# Patient Record
Sex: FEMALE | Race: WHITE | Employment: UNEMPLOYED | URBAN - METROPOLITAN AREA
[De-identification: names, ages, dates, MRNs, and addresses within clinical notes are randomized per-mention and may not be internally consistent; named-entity substitution may affect disease eponyms.]

---

## 2017-02-28 ENCOUNTER — HOSPITAL ENCOUNTER (OUTPATIENT)
Dept: MAMMOGRAPHY | Age: 52
Discharge: HOME | End: 2017-02-28
Attending: INTERNAL MEDICINE
Payer: COMMERCIAL

## 2017-02-28 DIAGNOSIS — Z12.31 VISIT FOR SCREENING MAMMOGRAM: ICD-10-CM

## 2017-02-28 PROCEDURE — 77067 SCR MAMMO BI INCL CAD: CPT

## 2018-02-13 ENCOUNTER — OFFICE VISIT (OUTPATIENT)
Dept: INTERNAL MEDICINE CLINIC | Age: 53
End: 2018-02-13

## 2018-02-13 VITALS
OXYGEN SATURATION: 99 % | RESPIRATION RATE: 14 BRPM | DIASTOLIC BLOOD PRESSURE: 56 MMHG | HEART RATE: 84 BPM | SYSTOLIC BLOOD PRESSURE: 111 MMHG | HEIGHT: 66 IN | TEMPERATURE: 97.9 F | WEIGHT: 124.8 LBS | BODY MASS INDEX: 20.06 KG/M2

## 2018-02-13 DIAGNOSIS — J06.9 VIRAL UPPER RESPIRATORY TRACT INFECTION: Primary | ICD-10-CM

## 2018-02-13 RX ORDER — MELATONIN
DAILY
COMMUNITY
End: 2020-01-22 | Stop reason: ALTCHOICE

## 2018-02-13 RX ORDER — AZITHROMYCIN 250 MG/1
250 TABLET, FILM COATED ORAL SEE ADMIN INSTRUCTIONS
Qty: 6 TAB | Refills: 0 | Status: SHIPPED | OUTPATIENT
Start: 2018-02-13 | End: 2018-02-18

## 2018-02-13 NOTE — PROGRESS NOTES
HISTORY OF PRESENT ILLNESS  Jenny Elizondo is a 46 y.o. female. HPI   Patient reports scratchy throat and fatigue. She states it feels like a cold that started last week. She denies fever, chills, or chest burning. Patient reports she is flying to [facility] this weekend. Review of Systems   All other systems reviewed and are negative. Physical Exam   Constitutional: She is oriented to person, place, and time. She appears well-developed and well-nourished. HENT:   Head: Normocephalic and atraumatic. Right Ear: External ear normal.   Left Ear: External ear normal.   Nose: Nose normal.   Mouth/Throat: Oropharynx is clear and moist.   Small pocket of fluid in ears, bilaterally. Eyes: Conjunctivae and EOM are normal.   Neck: Normal range of motion. Neck supple. Carotid bruit is not present. No thyroid mass and no thyromegaly present. Cardiovascular: Normal rate, regular rhythm, S1 normal, S2 normal, normal heart sounds and intact distal pulses. Pulmonary/Chest: Effort normal and breath sounds normal.   Abdominal: Soft. Normal appearance and bowel sounds are normal. There is no hepatosplenomegaly. There is no tenderness. Musculoskeletal: Normal range of motion. Neurological: She is alert and oriented to person, place, and time. She has normal strength. No cranial nerve deficit or sensory deficit. Coordination normal.   Skin: Skin is warm, dry and intact. No abrasion and no rash noted. Psychiatric: She has a normal mood and affect. Her behavior is normal. Judgment and thought content normal.   Nursing note and vitals reviewed. ASSESSMENT and PLAN  Diagnoses and all orders for this visit:    1. Viral upper respiratory tract infection  Not suspicious of the flu, patient will continue to watch symptoms. Instructed patient to use Afrin and Flonase. Patient will hold azithromycin if symptoms worsen and she becomes febrile, chilled, sinus pressure.      Other orders  -     azithromycin (ZITHROMAX) 250 mg tablet; Take 1 Tab by mouth See Admin Instructions for 5 days. lab results and schedule of future lab studies reviewed with patient  reviewed diet, exercise and weight control    Written by Nasreen Hagen, as dictated by Raji Arthur MD.     Current diagnosis and concerns discussed with pt at length. Understands risks and benefits or current treatment plan and medications and accepts the treatment and medication with any possible risks. Pt asks appropriate questions which were answered. Pt instructed to call with any concerns or problems.

## 2018-03-15 ENCOUNTER — HOSPITAL ENCOUNTER (OUTPATIENT)
Dept: MAMMOGRAPHY | Age: 53
Discharge: HOME | End: 2018-03-15
Attending: INTERNAL MEDICINE
Payer: COMMERCIAL

## 2018-03-15 DIAGNOSIS — Z12.31 VISIT FOR SCREENING MAMMOGRAM: ICD-10-CM

## 2018-03-15 PROCEDURE — 77067 SCR MAMMO BI INCL CAD: CPT

## 2018-07-24 ENCOUNTER — OFFICE VISIT (OUTPATIENT)
Dept: INTERNAL MEDICINE CLINIC | Age: 53
End: 2018-07-24

## 2018-07-24 VITALS
TEMPERATURE: 98.3 F | WEIGHT: 130.6 LBS | BODY MASS INDEX: 20.99 KG/M2 | RESPIRATION RATE: 14 BRPM | DIASTOLIC BLOOD PRESSURE: 58 MMHG | HEART RATE: 79 BPM | OXYGEN SATURATION: 98 % | SYSTOLIC BLOOD PRESSURE: 104 MMHG | HEIGHT: 66 IN

## 2018-07-24 DIAGNOSIS — H10.32 ACUTE BACTERIAL CONJUNCTIVITIS OF LEFT EYE: Primary | ICD-10-CM

## 2018-07-24 RX ORDER — SULFACETAMIDE SODIUM 100 MG/ML
2 SOLUTION/ DROPS OPHTHALMIC
Qty: 15 ML | Refills: 0 | Status: SHIPPED | OUTPATIENT
Start: 2018-07-24 | End: 2019-02-19 | Stop reason: ALTCHOICE

## 2018-07-24 NOTE — PROGRESS NOTES
HISTORY OF PRESENT ILLNESS  Nicci Fried is a 46 y.o. female. HPI  Bacterial Conjunctivitis: Pt reports pink eye in left eye. She notes irritation, itchiness, and goopiness. Review of Systems   All other systems reviewed and are negative. Physical Exam   Constitutional: She is oriented to person, place, and time. She appears well-developed and well-nourished. HENT:   Head: Normocephalic and atraumatic. Right Ear: External ear normal.   Left Ear: External ear normal.   Nose: Nose normal.   Mouth/Throat: Oropharynx is clear and moist.   Eyes: Conjunctivae and EOM are normal.   Left eye - irritated, erythematous. Neck: Normal range of motion. Neck supple. Carotid bruit is not present. No thyroid mass and no thyromegaly present. Cardiovascular: Normal rate, regular rhythm, S1 normal, S2 normal, normal heart sounds and intact distal pulses. Pulmonary/Chest: Effort normal and breath sounds normal.   Abdominal: Soft. Normal appearance and bowel sounds are normal. There is no hepatosplenomegaly. There is no tenderness. Musculoskeletal: Normal range of motion. Neurological: She is alert and oriented to person, place, and time. She has normal strength. No cranial nerve deficit or sensory deficit. Coordination normal.   Skin: Skin is warm, dry and intact. No abrasion and no rash noted. Psychiatric: She has a normal mood and affect. Her behavior is normal. Judgment and thought content normal.   Nursing note and vitals reviewed. ASSESSMENT and PLAN  Diagnoses and all orders for this visit:    1. Acute bacterial conjunctivitis of left eye  Stable, mild condition. Prescribed Sulfacetamide. Will monitor for any changes or improvements. Other orders  -     sulfacetamide (BLEPH-10) 10 % ophthalmic solution; Administer 2 Drops to left eye every three (3) hours. Lab results and schedule of future lab studies reviewed with patient. Reviewed diet, exercise and weight control.     Written by Katy Everett, as dictated by Faustina Sanchez MD.     Current diagnosis and concerns discussed with pt at length. Understands risks and benefits or current treatment plan and medications and accepts the treatment and medication with any possible risks. Pt asks appropriate questions which were answered. Pt instructed to call with any concerns or problems.

## 2018-07-26 ENCOUNTER — OFFICE VISIT (OUTPATIENT)
Dept: INTERNAL MEDICINE CLINIC | Age: 53
End: 2018-07-26

## 2018-07-26 VITALS
BODY MASS INDEX: 20.7 KG/M2 | HEIGHT: 66 IN | SYSTOLIC BLOOD PRESSURE: 108 MMHG | TEMPERATURE: 98 F | HEART RATE: 82 BPM | WEIGHT: 128.8 LBS | RESPIRATION RATE: 12 BRPM | OXYGEN SATURATION: 98 % | DIASTOLIC BLOOD PRESSURE: 71 MMHG

## 2018-07-26 DIAGNOSIS — H10.32 ACUTE BACTERIAL CONJUNCTIVITIS OF LEFT EYE: ICD-10-CM

## 2018-07-26 DIAGNOSIS — H65.02 ACUTE SEROUS OTITIS MEDIA OF LEFT EAR, RECURRENCE NOT SPECIFIED: ICD-10-CM

## 2018-07-26 DIAGNOSIS — H60.332 ACUTE SWIMMER'S EAR OF LEFT SIDE: Primary | ICD-10-CM

## 2018-07-26 RX ORDER — AZITHROMYCIN 250 MG/1
250 TABLET, FILM COATED ORAL SEE ADMIN INSTRUCTIONS
Qty: 6 TAB | Refills: 0 | Status: SHIPPED | OUTPATIENT
Start: 2018-07-26 | End: 2018-07-31

## 2018-07-26 RX ORDER — NEOMYCIN SULFATE, POLYMYXIN B SULFATE AND HYDROCORTISONE 10; 3.5; 1 MG/ML; MG/ML; [USP'U]/ML
3 SUSPENSION/ DROPS AURICULAR (OTIC) 3 TIMES DAILY
Qty: 10 ML | Refills: 1 | Status: SHIPPED | OUTPATIENT
Start: 2018-07-26 | End: 2020-01-22 | Stop reason: ALTCHOICE

## 2018-07-26 NOTE — PROGRESS NOTES
HISTORY OF PRESENT ILLNESS  Kaylee Weber is a 46 y.o. female. HPI  Presents with complaints of left ear pain for the past 2 days that worsened last night and has been radiating into left jawline and into left side of neck. Was seen in office on 7/24 for left bacterial conjunctivitis and has been using Bleph-10 eye drops; these symptoms have improved significantly but now having ear pain and muffling of hearing. Has had some nasal congestion but has allergies. Denies fever, chills, sore throat, cough. Has been taking Allegra, Sudafed and using Flonase nasal spray. Review of Systems   Constitutional: Positive for malaise/fatigue. Negative for chills and fever. HENT: Positive for congestion, ear pain and hearing loss. Negative for ear discharge, sinus pain and sore throat. Respiratory: Negative for cough. Cardiovascular: Negative for chest pain and palpitations. Gastrointestinal: Negative for abdominal pain, nausea and vomiting. Genitourinary: Negative for dysuria. Musculoskeletal: Negative for myalgias. Skin: Negative for rash. Neurological: Negative for dizziness and headaches. /71 (BP 1 Location: Left arm, BP Patient Position: Sitting)  Pulse 82  Temp 98 °F (36.7 °C) (Oral)   Resp 12  Ht 5' 6\" (1.676 m)  Wt 128 lb 12.8 oz (58.4 kg)  SpO2 98%  BMI 20.79 kg/m2  Physical Exam   Constitutional: She is oriented to person, place, and time. She appears well-developed and well-nourished. HENT:   Head: Normocephalic and atraumatic. Right Ear: Tympanic membrane, external ear and ear canal normal.   Left Ear: There is drainage and swelling. Tympanic membrane is injected. Nose: Nose normal.   Mouth/Throat: Oropharynx is clear and moist.   Tenderness with manipulation of left tragus; EAC appears erythematous with yellow drainage; visible portion of left TM injected. Eyes: Conjunctivae and EOM are normal. Pupils are equal, round, and reactive to light.    Neck: Normal range of motion. Neck supple. No thyromegaly present. Cardiovascular: Normal rate and regular rhythm. Pulmonary/Chest: Effort normal and breath sounds normal.   Lymphadenopathy:     She has no cervical adenopathy. Neurological: She is alert and oriented to person, place, and time. Psychiatric: She has a normal mood and affect. Her behavior is normal.   Nursing note and vitals reviewed. ASSESSMENT and PLAN  Diagnoses and all orders for this visit:    1. Acute swimmer's ear of left side  -     neomycin-polymyxin-hydrocortisone, buffered, (PEDIOTIC) 3.5-10,000-1 mg/mL-unit/mL-% otic suspension; Administer 3 Drops in left ear three (3) times daily. X 4 days    2. Acute serous otitis media of left ear, recurrence not specified  -     azithromycin (ZITHROMAX) 250 mg tablet; Take 1 Tab by mouth See Admin Instructions for 5 days. 3. Conjunctivitis left eye -- exam normal at this time. Follow up if signs and symptoms worsen or change. After hours number given.    reviewed diet, exercise and weight control  reviewed medications and side effects in detail

## 2018-07-26 NOTE — MR AVS SNAPSHOT
[facility] 
 
 
 [address] [place] [address] 
[phone] Patient: Rich Singh MRN:  [id number] Visit Information Date & Time Provider Department Dept. Phone Encounter #  
 7/26/2018  1:00 PM Carla Gibbons NP [facility] [phone] [id number] Your Appointments 8/27/2018  1:30 PM  
Complete Physical with Rayray Ness MD  
[facility] [address]) Appt Note: cpe, garry 1.16.18; CPE, fl 03/29/18 [address] [place] [phone]  
  
   
 [address] [place] [id number] Upcoming Health Maintenance Date Due COLONOSCOPY 9/3/1983 DTaP/Tdap/Td series (1 - Tdap) 9/3/1986 PAP AKA CERVICAL CYTOLOGY 2/19/2013 Influenza Age 5 to Adult 8/1/2018 BREAST CANCER SCRN MAMMOGRAM 3/15/2020 Allergies as of 7/26/2018  Review Complete On: 7/26/2018 By: Carla Gibbons NP Severity Noted Reaction Type Reactions Augmentin [Amoxicillin-pot Clavulanate]  12/23/2011    Other (comments) GI upset Current Immunizations  Never Reviewed Name Date H1N1 FLU VACCINE 2/4/2010 Influenza Vaccine 11/17/2014 Influenza Vaccine Split 10/10/2012, 2/4/2010 Not reviewed this visit You Were Diagnosed With   
  
 Codes Comments Acute swimmer's ear of left side    -  Primary ICD-10-CM: X79.853 ICD-9-CM: 380.12 Acute serous otitis media of left ear, recurrence not specified     ICD-10-CM: H65.02 
ICD-9-CM: 381.01 Vitals BP Pulse Temp Resp Height(growth percentile) Weight(growth percentile) 108/71 (BP 1 Location: Left arm, BP Patient Position: Sitting) 82 98 °F (36.7 °C) (Oral) 12 5' 6\" (1.676 m) 128 lb 12.8 oz (58.4 kg) SpO2 BMI OB Status Smoking Status 98% 20.79 kg/m2 Ablation Never Smoker BMI and BSA Data Body Mass Index Body Surface Area 20.79 kg/m 2 1.65 m 2 Preferred Pharmacy Pharmacy Name Phone [facility] [address] [phone] Your Updated Medication List  
  
   
This list is accurate as of 7/26/18  1:27 PM.  Always use your most recent med list. ALLEGRA PO Take  by mouth. azithromycin 250 mg tablet Commonly known as:  Denise Mika Take 1 Tab by mouth See Admin Instructions for 5 days. fluticasone 50 mcg/actuation nasal spray Commonly known as:  FLONASE  
2 sprays by Both Nostrils route daily for 30 days. MULTI-VITAMIN PO Take  by mouth. neomycin-polymyxin-hydrocortisone (buffered) 3.5-10,000-1 mg/mL-unit/mL-% otic suspension Commonly known as:  [place] Low Administer 3 Drops in left ear three (3) times daily. X 4 days SUDAFED PO Take  by mouth.  
  
 sulfacetamide 10 % ophthalmic solution Commonly known as:  BLEPH-10 Administer 2 Drops to left eye every three (3) hours. VITAMIN D3 1,000 unit tablet Generic drug:  cholecalciferol Take  by mouth daily. Prescriptions Sent to Pharmacy Refills  
 azithromycin (ZITHROMAX) 250 mg tablet 0 Sig: Take 1 Tab by mouth See Admin Instructions for 5 days. Class: Normal  
 Pharmacy: [facility] [place] [address] Ph #: [phone] Route: Oral  
 neomycin-polymyxin-hydrocortisone, buffered, (PEDIOTIC) 3.5-10,000-1 mg/mL-unit/mL-% otic suspension 1 Sig: Administer 3 Drops in left ear three (3) times daily. X 4 days Class: Normal  
 Pharmacy: [facility] [place] [address] Ph #: [phone] Route: Left Ear Patient Instructions Swimmer's Ear: Care Instructions Your Care Instructions Swimmer's ear (otitis externa) is inflammation or infection of the ear canal. This is the passage that leads from the outer ear to the eardrum. Any water, sand, or other debris that gets into the ear canal and stays there can cause swimmer's ear. Putting cotton swabs or other items in the ear to clean it can also cause this problem. Swimmer's ear can be very painful. But you can treat the pain and infection with medicines. You should feel better in a few days. Follow-up care is a key part of your treatment and safety. Be sure to make and go to all appointments, and call your doctor if you are having problems. It's also a good idea to know your test results and keep a list of the medicines you take. How can you care for yourself at home? Cleaning and care · Use antibiotic drops as your doctor directs. · Do not insert ear drops (other than the antibiotic ear drops) or anything else into the ear unless your doctor has told you to. · Avoid getting water in the ear until the problem clears up. Use cotton lightly coated with petroleum jelly as an earplug. Do not use plastic earplugs. · Use a hair dryer set on low to carefully dry the ear after you shower. · To ease ear pain, hold a warm washcloth against your ear. · Take pain medicines exactly as directed. ¨ If the doctor gave you a prescription medicine for pain, take it as prescribed. ¨ If you are not taking a prescription pain medicine, ask your doctor if you can take an over-the-counter medicine. Inserting ear drops · Warm the drops to body temperature by rolling the container in your hands. Or you can place it in a cup of warm water for a few minutes. · Lie down, with your ear facing up. · Place drops inside the ear. Follow your doctor's instructions (or the directions on the label) for how many drops to use. Gently wiggle the outer ear or pull the ear up and back to help the drops get into the ear. · It's important to keep the liquid in the ear canal for 3 to 5 minutes. When should you call for help? Call your doctor now or seek immediate medical care if: 
  · You have a new or higher fever.  
  · You have new or worse pain, swelling, warmth, or redness around or behind your ear.  
  · You have new or increasing pus or blood draining from your ear.  
 Watch closely for changes in your health, and be sure to contact your doctor if: 
  · You are not getting better after 2 days (48 hours). Where can you learn more? Go to http://mallory-chinmay.info/. Enter C706 in the search box to learn more about \"Swimmer's Ear: Care Instructions. \" Current as of: May 12, 2017 Content Version: 11.7 © 5177-2517 [employer]. Care instructions adapted under license by Public[employer]uff (which disclaims liability or warranty for this information). If you have questions about a medical condition or this instruction, always ask your healthcare professional. [place] [address] any warranty or liability for your use of this information. Middle Ear Fluid: Care Instructions Your Care Instructions Fluid often builds up inside the ear during a cold or allergies. Usually the fluid drains away, but sometimes a small tube in the ear, called the eustachian tube, stays blocked for months. Symptoms of fluid buildup may include: · Popping, ringing, or a feeling of fullness or pressure in the ear. · Trouble hearing. · Balance problems and dizziness. In most cases, you can treat yourself at home. Follow-up care is a key part of your treatment and safety. Be sure to make and go to all appointments, and call your doctor if you are having problems. It's also a good idea to know your test results and keep a list of the medicines you take. How can you care for yourself at home? · In most cases, the fluid clears up within a few months without treatment. You may need more tests if the fluid does not clear up after 3 months. · If your doctor prescribed antibiotics, take them as directed. Do not stop taking them just because you feel better. You need to take the full course of antibiotics. When should you call for help? Call your doctor now or seek immediate medical care if: 
  · You have symptoms of infection, such as: 
¨ Increased pain, swelling, warmth, or redness. ¨ Pus draining from the area. ¨ A fever.  
 Watch closely for changes in your health, and be sure to contact your doctor if: 
  · You notice changes in hearing.  
  · You do not get better as expected. Where can you learn more? Go to http://mallory-chinmay.info/. Enter U888 in the search box to learn more about \"Middle Ear Fluid: Care Instructions. \" Current as of: May 12, 2017 Content Version: 11.7 © 3497-3273 MyRef[employer]. Care instructions adapted under license by WineMeNow (which disclaims liability or warranty for this information). If you have questions about a medical condition or this instruction, always ask your healthcare professional. [place] [address] any warranty or liability for your use of this information. Introducing [facility]! Dear Aileen Self: 
Thank you for requesting a Medxnote account. Our records indicate that you already have an active Medxnote account. You can access your account anytime at [email or web address] Did you know that you can access your hospital and ER discharge instructions at any time in Medxnote? You can also review all of your test results from your hospital stay or ER visit. Additional Information If you have questions, please visit the Frequently Asked Questions section of the Medxnote website at [email or web address]/. Remember, Medxnote is NOT to be used for urgent needs. For medical emergencies, dial 911. Now available from your iPhone and Android! Please provide this summary of care documentation to your next provider. Your primary care clinician is listed as Gayle Torres. If you have any questions after today's visit, please call [phone].

## 2018-07-26 NOTE — PATIENT INSTRUCTIONS
Swimmer's Ear: Care Instructions  Your Care Instructions    Swimmer's ear (otitis externa) is inflammation or infection of the ear canal. This is the passage that leads from the outer ear to the eardrum. Any water, sand, or other debris that gets into the ear canal and stays there can cause swimmer's ear. Putting cotton swabs or other items in the ear to clean it can also cause this problem. Swimmer's ear can be very painful. But you can treat the pain and infection with medicines. You should feel better in a few days. Follow-up care is a key part of your treatment and safety. Be sure to make and go to all appointments, and call your doctor if you are having problems. It's also a good idea to know your test results and keep a list of the medicines you take. How can you care for yourself at home? Cleaning and care  · Use antibiotic drops as your doctor directs. · Do not insert ear drops (other than the antibiotic ear drops) or anything else into the ear unless your doctor has told you to. · Avoid getting water in the ear until the problem clears up. Use cotton lightly coated with petroleum jelly as an earplug. Do not use plastic earplugs. · Use a hair dryer set on low to carefully dry the ear after you shower. · To ease ear pain, hold a warm washcloth against your ear. · Take pain medicines exactly as directed. ¨ If the doctor gave you a prescription medicine for pain, take it as prescribed. ¨ If you are not taking a prescription pain medicine, ask your doctor if you can take an over-the-counter medicine. Inserting ear drops  · Warm the drops to body temperature by rolling the container in your hands. Or you can place it in a cup of warm water for a few minutes. · Lie down, with your ear facing up. · Place drops inside the ear. Follow your doctor's instructions (or the directions on the label) for how many drops to use.  Gently wiggle the outer ear or pull the ear up and back to help the drops get into the ear. · It's important to keep the liquid in the ear canal for 3 to 5 minutes. When should you call for help? Call your doctor now or seek immediate medical care if:    · You have a new or higher fever.     · You have new or worse pain, swelling, warmth, or redness around or behind your ear.     · You have new or increasing pus or blood draining from your ear.    Watch closely for changes in your health, and be sure to contact your doctor if:    · You are not getting better after 2 days (48 hours). Where can you learn more? Go to http://mallory-chinmay.info/. Enter C706 in the search box to learn more about \"Swimmer's Ear: Care Instructions. \"  Current as of: May 12, 2017  Content Version: 11.7  © 0841-0273 People Publishing. Care instructions adapted under license by Badge (which disclaims liability or warranty for this information). If you have questions about a medical condition or this instruction, always ask your healthcare professional. [place] [address] any warranty or liability for your use of this information. Middle Ear Fluid: Care Instructions  Your Care Instructions    Fluid often builds up inside the ear during a cold or allergies. Usually the fluid drains away, but sometimes a small tube in the ear, called the eustachian tube, stays blocked for months. Symptoms of fluid buildup may include:  · Popping, ringing, or a feeling of fullness or pressure in the ear. · Trouble hearing. · Balance problems and dizziness. In most cases, you can treat yourself at home. Follow-up care is a key part of your treatment and safety. Be sure to make and go to all appointments, and call your doctor if you are having problems. It's also a good idea to know your test results and keep a list of the medicines you take. How can you care for yourself at home? · In most cases, the fluid clears up within a few months without treatment.  You may need more tests if the fluid does not clear up after 3 months. · If your doctor prescribed antibiotics, take them as directed. Do not stop taking them just because you feel better. You need to take the full course of antibiotics. When should you call for help? Call your doctor now or seek immediate medical care if:    · You have symptoms of infection, such as:  ¨ Increased pain, swelling, warmth, or redness. ¨ Pus draining from the area. ¨ A fever.    Watch closely for changes in your health, and be sure to contact your doctor if:    · You notice changes in hearing.     · You do not get better as expected. Where can you learn more? Go to http://mallory-chinmay.info/. Enter V128 in the search box to learn more about \"Middle Ear Fluid: Care Instructions. \"  Current as of: May 12, 2017  Content Version: 11.7  © 4768-9350 [employer]. Care instructions adapted under license by SimGym (which disclaims liability or warranty for this information). If you have questions about a medical condition or this instruction, always ask your healthcare professional. [address] any warranty or liability for your use of this information.

## 2018-08-27 ENCOUNTER — OFFICE VISIT (OUTPATIENT)
Dept: INTERNAL MEDICINE CLINIC | Age: 53
End: 2018-08-27

## 2018-08-27 VITALS
HEART RATE: 77 BPM | SYSTOLIC BLOOD PRESSURE: 115 MMHG | RESPIRATION RATE: 18 BRPM | BODY MASS INDEX: 20.25 KG/M2 | OXYGEN SATURATION: 97 % | TEMPERATURE: 98 F | WEIGHT: 126 LBS | HEIGHT: 66 IN | DIASTOLIC BLOOD PRESSURE: 71 MMHG

## 2018-08-27 DIAGNOSIS — Z00.00 ROUTINE GENERAL MEDICAL EXAMINATION AT A HEALTH CARE FACILITY: Primary | ICD-10-CM

## 2018-08-27 NOTE — PROGRESS NOTES
HISTORY OF PRESENT ILLNESS  Bimal Garcia is a 46 y.o. female. 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 HENT: Negative for congestion, ear pain, hearing loss, sore throat and tinnitus. Eyes: Negative for blurred vision and double vision. Respiratory: Negative for cough, hemoptysis, sputum production, shortness of breath and wheezing. Cardiovascular: Negative for chest pain, palpitations, orthopnea, claudication, leg swelling and PND. Gastrointestinal: Negative for abdominal pain, blood in stool, constipation, diarrhea, heartburn, nausea and vomiting. Genitourinary: Negative for dysuria, flank pain, frequency, hematuria and urgency. Musculoskeletal: Negative for back pain, joint pain, myalgias and neck pain. Skin: Negative. Neurological: Negative for dizziness, tingling, sensory change, speech change, focal weakness, seizures, loss of consciousness, weakness and headaches. Endo/Heme/Allergies: Negative for environmental allergies and polydipsia. Does not bruise/bleed easily. Psychiatric/Behavioral: Negative for depression, memory loss, substance abuse and suicidal ideas. The patient is not nervous/anxious and does not have insomnia. Physical Exam   Constitutional: She is oriented to person, place, and time. She appears well-developed and well-nourished. HENT:   Head: Normocephalic and atraumatic. Right Ear: External ear normal.   Left Ear: External ear normal.   Nose: Nose normal.   Mouth/Throat: Oropharynx is clear and moist.   Eyes: Conjunctivae and EOM are normal. Pupils are equal, round, and reactive to light. Neck: Normal range of motion. Neck supple. Carotid bruit is not present.  No thyromegaly present. Cardiovascular: Normal rate, regular rhythm, S1 normal, S2 normal, normal heart sounds and intact distal pulses. Pulmonary/Chest: Effort normal and breath sounds normal.   Abdominal: Soft. Normal appearance and bowel sounds are normal. There is no hepatosplenomegaly. There is no tenderness. Musculoskeletal: Normal range of motion. Neurological: She is alert and oriented to person, place, and time. Skin: Skin is warm, dry and intact. Psychiatric: She has a normal mood and affect. Her behavior is normal. Judgment and thought content normal.   Nursing note and vitals reviewed. ASSESSMENT and PLAN  Diagnoses and all orders for this visit:    1.  Routine general medical examination at a health care facility  -     CBC W/O DIFF  -     METABOLIC PANEL, COMPREHENSIVE  -     LIPID PANEL  -     TSH 3RD GENERATION  -     URINALYSIS W/ RFLX MICROSCOPIC      lab results and schedule of future lab studies reviewed with patient  reviewed diet, exercise and weight control  cardiovascular risk and specific lipid/LDL goals reviewed  reviewed medications and side effects in detail

## 2018-08-27 NOTE — PROGRESS NOTES
1. Have you been to the ER, urgent care clinic since your last visit? Hospitalized since your last visit? No.    2. Have you seen or consulted any other health care providers outside of the [address] [place] [address] since your last visit? Include any pap smears or colon screening.  No.

## 2019-02-19 ENCOUNTER — OFFICE VISIT (OUTPATIENT)
Dept: INTERNAL MEDICINE CLINIC | Age: 54
End: 2019-02-19

## 2019-02-19 VITALS
WEIGHT: 130 LBS | SYSTOLIC BLOOD PRESSURE: 111 MMHG | TEMPERATURE: 99.1 F | HEART RATE: 85 BPM | RESPIRATION RATE: 14 BRPM | OXYGEN SATURATION: 99 % | BODY MASS INDEX: 20.89 KG/M2 | DIASTOLIC BLOOD PRESSURE: 73 MMHG | HEIGHT: 66 IN

## 2019-02-19 DIAGNOSIS — C44.219 BCC (BASAL CELL CARCINOMA), EAR, LEFT: ICD-10-CM

## 2019-02-19 DIAGNOSIS — R09.81 CHRONIC NASAL CONGESTION: ICD-10-CM

## 2019-02-19 DIAGNOSIS — J01.40 ACUTE PANSINUSITIS, RECURRENCE NOT SPECIFIED: Primary | ICD-10-CM

## 2019-02-19 RX ORDER — AZITHROMYCIN 250 MG/1
250 TABLET, FILM COATED ORAL SEE ADMIN INSTRUCTIONS
Qty: 6 TAB | Refills: 0 | Status: SHIPPED | OUTPATIENT
Start: 2019-02-19 | End: 2019-02-24

## 2019-02-19 RX ORDER — MONTELUKAST SODIUM 10 MG/1
10 TABLET ORAL DAILY
Qty: 30 TAB | Refills: 5 | Status: SHIPPED | OUTPATIENT
Start: 2019-02-19 | End: 2020-01-22 | Stop reason: ALTCHOICE

## 2019-02-19 RX ORDER — TRIAMCINOLONE ACETONIDE 55 UG/1
2 SPRAY, METERED NASAL DAILY
Qty: 1 BOTTLE | Refills: 0
Start: 2019-02-19 | End: 2020-01-22 | Stop reason: ALTCHOICE

## 2019-02-19 NOTE — PROGRESS NOTES
HISTORY OF PRESENT ILLNESS  Minerva Ladd is a 48 y.o. female. HPI  Presents with complaints of swollen neck glands, sinus congestion, left ear fullness for the past week. Had basal cell lesion removed from top of left external ear with skin graft on 2/5/19 performed by Dr. Serge Mccall, plastic surgery. Initially thought symptoms may be related to that surgery but has not noted any redness or swelling to site; was recently rechecked in plastics office and symptoms do not appear related to that surgery. Symptoms seemed to intensify on 2/15 and she went to [facility] urgent care [place] on 2/16 where she had cerumen removed from bilateral ear canals. Was told that she had canal infection on left and was given antibiotic eardrops but has not noted any improvement. Over the past several days she has been feeling more sinus congestion and occasional chills. Tried some Sudafed OTC without improvement. Has had chronic nasal congestion for years where she has limited ability to breathe through her nostrils; her  has been very concerned about this and would like her to see an ENT for further evaluation. Takes Allegra on a daily basis and uses Flonase nasal spray but has not noted much improvement.     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    HX SHOULDER ARTHROSCOPY  09/2012    left shoulder    IMPLANT BREAST SILICONE/EQ Bilateral 75/5771    IMPLANT BREAST SILICONE/EQ Bilateral 6965     Social History     Socioeconomic History    Marital status:      Spouse name: Not on file    Number of children: Not on file    Years of education: Not on file    Highest education level: Not on file   Social Needs    Financial resource strain: Not on file   Mel-Alex insecurity - worry: Not on file    Food insecurity - inability: Not on file    Transportation needs - medical: Not on file   Contractually needs - non-medical: Not on file   Occupational History    Not on file   Tobacco Use    Smoking status: Never Smoker    Smokeless tobacco: Never Used   Substance and Sexual Activity    Alcohol use: Yes     Alcohol/week: 2.0 oz     Types: 4 Glasses of wine per week     Comment: socially    Drug use: No    Sexual activity: No   Other Topics Concern    Not on file   Social History Narrative    Not on file     Family History   Problem Relation Age of Onset    Stroke Mother     Hypertension Mother     Cancer Maternal Aunt         leukemia    Heart Disease Maternal Grandmother     Heart Disease Maternal Grandfather      Current Outpatient Medications   Medication Sig    azithromycin (ZITHROMAX) 250 mg tablet Take 1 Tab by mouth See Admin Instructions for 5 days.  montelukast (SINGULAIR) 10 mg tablet Take 1 Tab by mouth daily.  triamcinolone (NASACORT AQ) 55 mcg nasal inhaler 2 Sprays by Both Nostrils route daily.  pseudoephedrine HCl (SUDAFED PO) Take  by mouth.  neomycin-polymyxin-hydrocortisone, buffered, (PEDIOTIC) 3.5-10,000-1 mg/mL-unit/mL-% otic suspension Administer 3 Drops in left ear three (3) times daily. X 4 days    cholecalciferol (VITAMIN D3) 1,000 unit tablet Take  by mouth daily.  FEXOFENADINE HCL (ALLEGRA PO) Take  by mouth.  fluticasone (FLONASE) 50 mcg/actuation nasal spray 2 sprays by Both Nostrils route daily for 30 days.  MULTIVITAMINS WITH FLUORIDE (MULTI-VITAMIN PO) Take  by mouth. No current facility-administered medications for this visit.       Allergies   Allergen Reactions    Augmentin [Amoxicillin-Pot Clavulanate] Other (comments)     GI upset     Immunization History   Administered Date(s) Administered    H1N1 Influenza Virus Vaccine 02/04/2010    Influenza Vaccine 11/17/2014    Influenza Vaccine Split 02/04/2010, 10/10/2012       Review of Systems   Constitutional: Positive for chills and malaise/fatigue. Negative for fever. HENT: Positive for congestion, ear pain and sinus pain. Respiratory: Negative for cough and shortness of breath. Cardiovascular: Negative for chest pain and palpitations. Gastrointestinal: Negative for nausea and vomiting. Musculoskeletal: Negative for myalgias. Neurological: Positive for headaches. Negative for dizziness. Physical Exam   Constitutional: She is oriented to person, place, and time. She appears well-developed and well-nourished. HENT:   Head: Normocephalic and atraumatic. Right Ear: Tympanic membrane, external ear and ear canal normal. No drainage or swelling. Tympanic membrane is not injected and not erythematous. Left Ear: Tympanic membrane and ear canal normal. No drainage or swelling. Tympanic membrane is not injected and not erythematous. Ears:    Nose: Mucosal edema present. Right sinus exhibits maxillary sinus tenderness and frontal sinus tenderness. Left sinus exhibits maxillary sinus tenderness. Mouth/Throat: Posterior oropharyngeal erythema present. Healing incision to left upper ear   Neck: Normal range of motion. Neck supple. No thyromegaly present. Cardiovascular: Normal rate and regular rhythm. Pulmonary/Chest: Effort normal and breath sounds normal.   Lymphadenopathy:     She has cervical adenopathy. Neurological: She is alert and oriented to person, place, and time. Psychiatric: She has a normal mood and affect. Her behavior is normal.   Nursing note and vitals reviewed. ASSESSMENT and PLAN  Diagnoses and all orders for this visit:    1. Acute pansinusitis, recurrence not specified -- can stop using otic drops; no signs of otitis externa  -     azithromycin (ZITHROMAX) 250 mg tablet; Take 1 Tab by mouth See Admin Instructions for 5 days.     2. Chronic nasal congestion  -     REFERRAL TO ENT-OTOLARYNGOLOGY  - montelukast (SINGULAIR) 10 mg tablet; Take 1 Tab by mouth daily. -     triamcinolone (NASACORT AQ) 55 mcg nasal inhaler; 2 Sprays by Both Nostrils route daily. 3. BCC (basal cell carcinoma), ear, left -- appears to be healing well      Follow up if signs and symptoms worsen or change. After hours number given.    lab results and schedule of future lab studies reviewed with patient  reviewed diet, exercise and weight control  reviewed medications and side effects in detail

## 2019-02-19 NOTE — PATIENT INSTRUCTIONS
Sinusitis: Care Instructions  Your Care Instructions    Sinusitis is an infection of the lining of the sinus cavities in your head. Sinusitis often follows a cold. It causes pain and pressure in your head and face. In most cases, sinusitis gets better on its own in 1 to 2 weeks. But some mild symptoms may last for several weeks. Sometimes antibiotics are needed. Follow-up care is a key part of your treatment and safety. Be sure to make and go to all appointments, and call your doctor if you are having problems. It's also a good idea to know your test results and keep a list of the medicines you take. How can you care for yourself at home? · Take an over-the-counter pain medicine, such as acetaminophen (Tylenol), ibuprofen (Advil, Motrin), or naproxen (Aleve). Read and follow all instructions on the label. · If the doctor prescribed antibiotics, take them as directed. Do not stop taking them just because you feel better. You need to take the full course of antibiotics. · Be careful when taking over-the-counter cold or flu medicines and Tylenol at the same time. Many of these medicines have acetaminophen, which is Tylenol. Read the labels to make sure that you are not taking more than the recommended dose. Too much acetaminophen (Tylenol) can be harmful. · Breathe warm, moist air from a steamy shower, a hot bath, or a sink filled with hot water. Avoid cold, dry air. Using a humidifier in your home may help. Follow the directions for cleaning the machine. · Use saline (saltwater) nasal washes to help keep your nasal passages open and wash out mucus and bacteria. You can buy saline nose drops at a grocery store or drugstore. Or you can make your own at home by adding 1 teaspoon of salt and 1 teaspoon of baking soda to 2 cups of distilled water. If you make your own, fill a bulb syringe with the solution, insert the tip into your nostril, and squeeze gently. Verlee Leader your nose.   · Put a hot, wet towel or a warm gel pack on your face 3 or 4 times a day for 5 to 10 minutes each time. · Try a decongestant nasal spray like oxymetazoline (Afrin). Do not use it for more than 3 days in a row. Using it for more than 3 days can make your congestion worse. When should you call for help? Call your doctor now or seek immediate medical care if:    · You have new or worse swelling or redness in your face or around your eyes.     · You have a new or higher fever.    Watch closely for changes in your health, and be sure to contact your doctor if:    · You have new or worse facial pain.     · The mucus from your nose becomes thicker (like pus) or has new blood in it.     · You are not getting better as expected. Where can you learn more? Go to http://mallory-chinmay.info/. Enter D136 in the search box to learn more about \"Sinusitis: Care Instructions. \"  Current as of: March 27, 2018  Content Version: 11.9  © 8812-7412 [employer]. Care instructions adapted under license by [employer] (which disclaims liability or warranty for this information). If you have questions about a medical condition or this instruction, always ask your healthcare professional. [place] [address] any warranty or liability for your use of this information.

## 2019-02-21 ENCOUNTER — OFFICE VISIT (OUTPATIENT)
Dept: INTERNAL MEDICINE CLINIC | Age: 54
End: 2019-02-21

## 2019-02-21 VITALS
OXYGEN SATURATION: 99 % | WEIGHT: 129.6 LBS | HEART RATE: 77 BPM | TEMPERATURE: 98 F | DIASTOLIC BLOOD PRESSURE: 77 MMHG | HEIGHT: 66 IN | SYSTOLIC BLOOD PRESSURE: 117 MMHG | RESPIRATION RATE: 18 BRPM | BODY MASS INDEX: 20.83 KG/M2

## 2019-02-21 DIAGNOSIS — L03.221 CELLULITIS OF NECK: Primary | ICD-10-CM

## 2019-02-21 DIAGNOSIS — R68.89 FLU-LIKE SYMPTOMS: ICD-10-CM

## 2019-02-21 LAB
QUICKVUE INFLUENZA TEST: NEGATIVE
VALID INTERNAL CONTROL?: YES

## 2019-02-21 RX ORDER — CEFTRIAXONE 1 G/1
1 INJECTION, POWDER, FOR SOLUTION INTRAMUSCULAR; INTRAVENOUS ONCE
Qty: 1 VIAL | Refills: 0
Start: 2019-02-21 | End: 2019-02-21

## 2019-02-21 RX ORDER — SULFAMETHOXAZOLE AND TRIMETHOPRIM 800; 160 MG/1; MG/1
1 TABLET ORAL 2 TIMES DAILY
Qty: 20 TAB | Refills: 0 | Status: SHIPPED | OUTPATIENT
Start: 2019-02-21 | End: 2020-01-22 | Stop reason: ALTCHOICE

## 2019-02-21 NOTE — PROGRESS NOTES
HISTORY OF PRESENT ILLNESS Michelle Ang is a 48 y.o. female. HPI Cellulitis of Neck: Pt underwent surgery on 2/05/19 to remove basal cell lesion from top of left external ear with skin graft. She has been experiencing left ear pain, swelling and tenderness behind left ear and left side of neck, and left ear fullness. Denies sinus drainage. Pt started to experience worsening left ear pain and chills on 2/15. She visited urgent care on 2/16 which removed cerumen from left ear and prescribed antibiotic eardrops. Pt followed up with plastic surgeon (Dr. Jhoan Arambula) who recommended f/u with PCP. She followed up with [facility]les on 2/19/19 who suspected sinus infection and prescribed Azithromycin. Denies improvement in symptoms with Azithromycin. Pt was tested negative for flu today in clinic. Review of Systems All other systems reviewed and are negative. Physical Exam  
Constitutional: She is oriented to person, place, and time. She appears well-developed and well-nourished. HENT:  
Head: Normocephalic and atraumatic. Right Ear: External ear normal.  
Left Ear: External ear normal.  
Nose: Nose normal.  
Mouth/Throat: Oropharynx is clear and moist.  
Skin graft healing well. Erythema, warmth, and tightness behind left ear. Tenderness in left posterior cervical lymph nodes. Eyes: Conjunctivae and EOM are normal.  
Neck: Normal range of motion. Neck supple. Carotid bruit is not present. No thyroid mass and no thyromegaly present. Cardiovascular: Normal rate, regular rhythm, S1 normal, S2 normal, normal heart sounds and intact distal pulses. Pulmonary/Chest: Effort normal and breath sounds normal.  
Abdominal: Soft. Normal appearance and bowel sounds are normal. There is no hepatosplenomegaly. There is no tenderness. Musculoskeletal: Normal range of motion. Neurological: She is alert and oriented to person, place, and time. She has normal strength. No cranial nerve deficit or sensory deficit. Coordination normal.  
Skin: Skin is warm, dry and intact. No abrasion and no rash noted. Psychiatric: She has a normal mood and affect. Her behavior is normal. Judgment and thought content normal.  
Nursing note and vitals reviewed. ASSESSMENT and PLAN Diagnoses and all orders for this visit: 1. Cellulitis of neck Prescribed Bactrim. Administered Rocephin injection today in office. Instructed pt to update plastic surgeon (Dr. Pernell Sanabria) with current symptoms. Pt will f/u with me tomorrow for second penicillin injection. -     cefTRIAXone (ROCEPHIN) 1 gram injection; 1 g by IntraMUSCular route once for 1 dose. -     CEFTRIAXONE SODIUM INJECTION  MG 
-     NM THER/PROPH/DIAG INJECTION, SUBCUT/IM 
-     trimethoprim-sulfamethoxazole (BACTRIM DS, SEPTRA DS) 160-800 mg per tablet; Take 1 Tab by mouth two (2) times a day. 2. Flu-like symptoms Pt was tested negative for flu today in clinic.  
-     AMB POC RAPID INFLUENZA TEST Lab results and schedule of future lab studies reviewed with patient. Reviewed diet, exercise and weight control. Written by Claudia Ghotra, as dictated by Iván Hagen MD.  
 
Current diagnosis and concerns discussed with pt at length. Understands risks and benefits or current treatment plan and medications and accepts the treatment and medication with any possible risks. Pt asks appropriate questions which were answered. Pt instructed to call with any concerns or problems. This note will not be viewable in [address]

## 2019-02-22 ENCOUNTER — OFFICE VISIT (OUTPATIENT)
Dept: INTERNAL MEDICINE CLINIC | Age: 54
End: 2019-02-22

## 2019-02-22 VITALS
SYSTOLIC BLOOD PRESSURE: 119 MMHG | WEIGHT: 129.8 LBS | RESPIRATION RATE: 18 BRPM | OXYGEN SATURATION: 98 % | BODY MASS INDEX: 20.86 KG/M2 | TEMPERATURE: 98.8 F | HEIGHT: 66 IN | HEART RATE: 79 BPM | DIASTOLIC BLOOD PRESSURE: 81 MMHG

## 2019-02-22 DIAGNOSIS — L03.221 CELLULITIS OF NECK: Primary | ICD-10-CM

## 2019-02-22 DIAGNOSIS — R53.83 OTHER FATIGUE: ICD-10-CM

## 2019-02-22 RX ORDER — CIPROFLOXACIN 500 MG/1
TABLET ORAL 2 TIMES DAILY
COMMUNITY
End: 2020-01-22 | Stop reason: ALTCHOICE

## 2019-02-23 LAB
ALBUMIN SERPL-MCNC: 4.9 G/DL (ref 3.5–5.5)
ALBUMIN/GLOB SERPL: 2 {RATIO} (ref 1.2–2.2)
ALP SERPL-CCNC: 44 IU/L (ref 39–117)
ALT SERPL-CCNC: 9 IU/L (ref 0–32)
AST SERPL-CCNC: 15 IU/L (ref 0–40)
BASOPHILS # BLD AUTO: 0 X10E3/UL (ref 0–0.2)
BASOPHILS NFR BLD AUTO: 0 %
BILIRUB SERPL-MCNC: 0.5 MG/DL (ref 0–1.2)
BUN SERPL-MCNC: 9 MG/DL (ref 6–24)
BUN/CREAT SERPL: 10 (ref 9–23)
CALCIUM SERPL-MCNC: 9.8 MG/DL (ref 8.7–10.2)
CHLORIDE SERPL-SCNC: 102 MMOL/L (ref 96–106)
CO2 SERPL-SCNC: 24 MMOL/L (ref 20–29)
CREAT SERPL-MCNC: 0.88 MG/DL (ref 0.57–1)
EBV EA IGG SER-ACNC: 82.9 U/ML (ref 0–8.9)
EBV NA IGG SER IA-ACNC: 119 U/ML (ref 0–17.9)
EBV VCA IGG SER IA-ACNC: 259 U/ML (ref 0–17.9)
EBV VCA IGM SER IA-ACNC: <36 U/ML (ref 0–35.9)
EOSINOPHIL # BLD AUTO: 0 X10E3/UL (ref 0–0.4)
EOSINOPHIL NFR BLD AUTO: 1 %
ERYTHROCYTE [DISTWIDTH] IN BLOOD BY AUTOMATED COUNT: 13.8 % (ref 12.3–15.4)
GLOBULIN SER CALC-MCNC: 2.4 G/DL (ref 1.5–4.5)
GLUCOSE SERPL-MCNC: 75 MG/DL (ref 65–99)
HCT VFR BLD AUTO: 39.8 % (ref 34–46.6)
HGB BLD-MCNC: 13.1 G/DL (ref 11.1–15.9)
IMM GRANULOCYTES # BLD AUTO: 0 X10E3/UL (ref 0–0.1)
IMM GRANULOCYTES NFR BLD AUTO: 0 %
LYMPHOCYTES # BLD AUTO: 1.8 X10E3/UL (ref 0.7–3.1)
LYMPHOCYTES NFR BLD AUTO: 31 %
MCH RBC QN AUTO: 31.8 PG (ref 26.6–33)
MCHC RBC AUTO-ENTMCNC: 32.9 G/DL (ref 31.5–35.7)
MCV RBC AUTO: 97 FL (ref 79–97)
MONOCYTES # BLD AUTO: 0.6 X10E3/UL (ref 0.1–0.9)
MONOCYTES NFR BLD AUTO: 10 %
NEUTROPHILS # BLD AUTO: 3.4 X10E3/UL (ref 1.4–7)
NEUTROPHILS NFR BLD AUTO: 58 %
PLATELET # BLD AUTO: 227 X10E3/UL (ref 150–379)
POTASSIUM SERPL-SCNC: 4.1 MMOL/L (ref 3.5–5.2)
PROT SERPL-MCNC: 7.3 G/DL (ref 6–8.5)
RBC # BLD AUTO: 4.12 X10E6/UL (ref 3.77–5.28)
SERVICE CMNT-IMP: ABNORMAL
SODIUM SERPL-SCNC: 139 MMOL/L (ref 134–144)
WBC # BLD AUTO: 5.9 X10E3/UL (ref 3.4–10.8)

## 2019-04-08 ENCOUNTER — HOSPITAL ENCOUNTER (OUTPATIENT)
Dept: MAMMOGRAPHY | Age: 54
Discharge: HOME | End: 2019-04-08
Attending: INTERNAL MEDICINE
Payer: COMMERCIAL

## 2019-04-08 DIAGNOSIS — Z12.39 SCREENING BREAST EXAMINATION: ICD-10-CM

## 2019-04-08 PROCEDURE — 77067 SCR MAMMO BI INCL CAD: CPT

## 2020-01-07 ENCOUNTER — OFFICE VISIT (OUTPATIENT)
Dept: INTERNAL MEDICINE CLINIC | Age: 55
End: 2020-01-07

## 2020-01-07 VITALS
DIASTOLIC BLOOD PRESSURE: 71 MMHG | HEIGHT: 66 IN | WEIGHT: 135.6 LBS | SYSTOLIC BLOOD PRESSURE: 121 MMHG | RESPIRATION RATE: 16 BRPM | TEMPERATURE: 97.9 F | HEART RATE: 84 BPM | BODY MASS INDEX: 21.79 KG/M2 | OXYGEN SATURATION: 100 %

## 2020-01-07 DIAGNOSIS — M25.551 RIGHT HIP PAIN: Primary | ICD-10-CM

## 2020-01-07 DIAGNOSIS — Z00.00 ROUTINE GENERAL MEDICAL EXAMINATION AT A HEALTH CARE FACILITY: ICD-10-CM

## 2020-01-07 DIAGNOSIS — N64.4 BREAST PAIN: ICD-10-CM

## 2020-01-07 NOTE — PROGRESS NOTES
HISTORY OF PRESENT ILLNESS  Rich Singh is a 47 y.o. female. HPI   Veins in breasts: Pt reports that in October she started to see more prominent veins in her breasts (L>R). She had a normal mammogram in April. She notes some occasional tenderness, but thinks that maybe due to cycle. Cannot confirm or deny menopause (due to ablation). Confirms occasional hot flashes this past fall. Denies SOB, CP, or lumps. R hip pain: She notes increased joint on pain (R>>L). She works out frequently- including weight lifting and tennis. Denies back pain, numbness, tingling, or all over body aches. Pt has her colonoscopy at [facility] (Dr. Paresh Johnson) when she was 48- normal and 10 years (according to pt). Pt reports that she has officially moved to [facility], and will be selling her house here. She is getting into tennis and loves the area. Review of Systems   Musculoskeletal:        Breast pain/ prominent veins  R hip pain     All other systems reviewed and are negative. Physical Exam  Vitals signs and nursing note reviewed. Constitutional:       Appearance: She is well-developed. HENT:      Head: Normocephalic and atraumatic. Right Ear: External ear normal.      Left Ear: External ear normal.      Nose: Nose normal.   Eyes:      Conjunctiva/sclera: Conjunctivae normal.      Pupils: Pupils are equal, round, and reactive to light. Neck:      Musculoskeletal: Normal range of motion and neck supple. Cardiovascular:      Rate and Rhythm: Normal rate and regular rhythm. Heart sounds: Normal heart sounds. Pulmonary:      Effort: Pulmonary effort is normal.      Breath sounds: Normal breath sounds. Chest:      Breasts: Breasts are symmetrical.         Right: No inverted nipple, mass, nipple discharge, skin change or tenderness. Left: No inverted nipple, mass, nipple discharge, skin change or tenderness.       Comments:  Veins looks normal  Abdominal:      General: Bowel sounds are normal.      Palpations: Abdomen is soft. Genitourinary:     Vagina: Normal.      Rectum: Normal. Guaiac result negative. Normal anal tone. Musculoskeletal: Normal range of motion. Comments: Denies pain upon rotation of legs/hips   Skin:     General: Skin is warm and dry. Neurological:      Mental Status: She is alert and oriented to person, place, and time. Psychiatric:         Behavior: Behavior normal.         Thought Content: Thought content normal.         Judgment: Judgment normal.         ASSESSMENT and PLAN  Diagnoses and all orders for this visit:    1. Right hip pain  Informed pt that since her hip did not hurt upon manipulation her pain is most likely due to muscular issues- not hip problems. Advised pt to stretch and exercise her muscles more before and after working out. Encouraged pt to start doing yoga 1-2x weekly. Will continue to monitor for improvements or changes. 2. Breast pain  Veins looks normal upon exam. Informed pt that if her neck veins were more prominent, swollen, and causing neck pain- then we would get a CXR. Discussed with pt that if her pain increases over the next week, she needs to get in and see breast specialist- the veins may be just normal variant shayne from having implants? Will continue to monitor for improvements or changes. 3. Routine general medical examination at a health care facility  -     CBC W/O DIFF; Future  -     LIPID PANEL; Future  -     METABOLIC PANEL, COMPREHENSIVE; Future  -     TSH 3RD GENERATION; Future     Additional comments: Discussed the Shingrex vaccine with pt. Informed pt that this new vaccine is replacing the old one with a 90% protective factor. Explained to pt that the 2-part vaccine can cause a flu-like illness for 24-48 hours. Lab results and schedule of future lab studies reviewed with patient. Reviewed diet, exercise and weight control.     Written by Shayy Constantino, as dictated by Moose Holley MD. Current diagnosis and concerns discussed with pt at length. Understands risks and benefits or current treatment plan and medications and accepts the treatment and medication with any possible risks. Pt asks appropriate questions which were answered. Pt instructed to call with any concerns or problems. This note will not be viewable in [address]

## 2020-01-08 LAB
ALBUMIN SERPL-MCNC: 4.7 G/DL (ref 3.5–5.5)
ALBUMIN/GLOB SERPL: 2 {RATIO} (ref 1.2–2.2)
ALP SERPL-CCNC: 48 IU/L (ref 39–117)
ALT SERPL-CCNC: 15 IU/L (ref 0–32)
AST SERPL-CCNC: 19 IU/L (ref 0–40)
BILIRUB SERPL-MCNC: 0.4 MG/DL (ref 0–1.2)
BUN SERPL-MCNC: 10 MG/DL (ref 6–24)
BUN/CREAT SERPL: 15 (ref 9–23)
CALCIUM SERPL-MCNC: 9.3 MG/DL (ref 8.7–10.2)
CHLORIDE SERPL-SCNC: 100 MMOL/L (ref 96–106)
CHOLEST SERPL-MCNC: 219 MG/DL (ref 100–199)
CO2 SERPL-SCNC: 24 MMOL/L (ref 20–29)
CREAT SERPL-MCNC: 0.68 MG/DL (ref 0.57–1)
ERYTHROCYTE [DISTWIDTH] IN BLOOD BY AUTOMATED COUNT: 11.9 % (ref 11.7–15.4)
GLOBULIN SER CALC-MCNC: 2.4 G/DL (ref 1.5–4.5)
GLUCOSE SERPL-MCNC: 89 MG/DL (ref 65–99)
HCT VFR BLD AUTO: 39.4 % (ref 34–46.6)
HDLC SERPL-MCNC: 113 MG/DL
HGB BLD-MCNC: 13.2 G/DL (ref 11.1–15.9)
INTERPRETATION, 910389: NORMAL
LDLC SERPL CALC-MCNC: 94 MG/DL (ref 0–99)
MCH RBC QN AUTO: 31.5 PG (ref 26.6–33)
MCHC RBC AUTO-ENTMCNC: 33.5 G/DL (ref 31.5–35.7)
MCV RBC AUTO: 94 FL (ref 79–97)
PLATELET # BLD AUTO: 200 X10E3/UL (ref 150–450)
POTASSIUM SERPL-SCNC: 4 MMOL/L (ref 3.5–5.2)
PROT SERPL-MCNC: 7.1 G/DL (ref 6–8.5)
RBC # BLD AUTO: 4.19 X10E6/UL (ref 3.77–5.28)
SODIUM SERPL-SCNC: 138 MMOL/L (ref 134–144)
TRIGL SERPL-MCNC: 60 MG/DL (ref 0–149)
TSH SERPL DL<=0.005 MIU/L-ACNC: 4.74 UIU/ML (ref 0.45–4.5)
VLDLC SERPL CALC-MCNC: 12 MG/DL (ref 5–40)
WBC # BLD AUTO: 5.5 X10E3/UL (ref 3.4–10.8)

## 2020-01-22 ENCOUNTER — OFFICE VISIT (OUTPATIENT)
Dept: SURGERY | Age: 55
End: 2020-01-22

## 2020-01-22 VITALS
DIASTOLIC BLOOD PRESSURE: 59 MMHG | HEART RATE: 85 BPM | HEIGHT: 66 IN | WEIGHT: 134 LBS | SYSTOLIC BLOOD PRESSURE: 142 MMHG | BODY MASS INDEX: 21.53 KG/M2

## 2020-01-22 DIAGNOSIS — N60.11 FIBROCYSTIC BREAST CHANGES, RIGHT: Primary | ICD-10-CM

## 2020-01-22 RX ORDER — SPIRONOLACTONE 25 MG/1
25 TABLET ORAL DAILY
Qty: 20 TAB | Refills: 2 | Status: SHIPPED | OUTPATIENT
Start: 2020-01-22

## 2020-01-22 NOTE — LETTER
1/27/2020 10:27 AM 
 
Patient:  Bimal Garcia YOB: 1965 Date of Visit: 1/22/2020 Dear Dr. Dulce Gross: Thank you for referring Ms. Carolina Ortiz to me for evaluation/treatment. Below are the relevant portions of my assessment and plan of care. HISTORY OF PRESENT ILLNESS Bimal Garcia is a 47 y.o. female. HPI 
NEW patient consult referred by Dr. Dulce Gross for breast pain and enlargement. Her LEFT breast has a lot of veins and has pain that is always there. She describes \"dull, throbbing or burning ache,\" mostly on the LEFT side, and sometimes on the RIGHT. She states her breasts feel swollen all the time, similar to before a period. However, she notes hx of ablation 2 years ago, and is not having any other cyclical symptoms now. She denies any other breast changes.   
  
2014 -  Revision Breast augmentation 2009 - Breast augmentation 
  
Family History: 
Denies FH of breast or ovarian cancer.  
   
Mammogram, 4/2019, BIRADS 1 Past Medical History:  
Diagnosis Date  Allergic rhinitis 2/4/2010  Brain tumor ([address] 2/4/2010 Past Surgical History:  
Procedure Laterality Date  BREAST SURGERY PROCEDURE UNLISTED  2007  
 breast augmentation  HX HEENT  age 13  
 removal pineal brain tumor  HX SHOULDER ARTHROSCOPY  09/2012  
 left shoulder  IMPLANT BREAST SILICONE/EQ Bilateral 21/1451  IMPLANT BREAST SILICONE/EQ Bilateral 1726 Social History Socioeconomic History  Marital status:  Spouse name: Not on file  Number of children: Not on file  Years of education: Not on file  Highest education level: Not on file Occupational History  Not on file Social Needs  Financial resource strain: Not on file  Food insecurity:  
  Worry: Not on file Inability: Not on file  Transportation needs:  
  Medical: Not on file Non-medical: Not on file Tobacco Use  Smoking status: Never Smoker  Smokeless tobacco: Never Used Substance and Sexual Activity  Alcohol use: Yes Alcohol/week: 3.3 standard drinks Types: 4 Glasses of wine per week Comment: socially  Drug use: No  
 Sexual activity: Never Lifestyle  Physical activity:  
  Days per week: Not on file Minutes per session: Not on file  Stress: Not on file Relationships  Social connections:  
  Talks on phone: Not on file Gets together: Not on file Attends [employer] service: Not on file Active member of club or organization: Not on file Attends meetings of clubs or organizations: Not on file Relationship status: Not on file  Intimate partner violence:  
  Fear of current or ex partner: Not on file Emotionally abused: Not on file Physically abused: Not on file Forced sexual activity: Not on file Other Topics Concern  Not on file Social History Narrative  Not on file Current Outpatient Medications on File Prior to Visit Medication Sig Dispense Refill  fluticasone (FLONASE) 50 mcg/actuation nasal spray 2 sprays by Both Nostrils route daily for 30 days. 1 Bottle 5  FEXOFENADINE HCL (ALLEGRA PO) Take  by mouth.  [DISCONTINUED] ciprofloxacin HCl (CIPRO) 500 mg tablet Take  by mouth two (2) times a day.  [DISCONTINUED] trimethoprim-sulfamethoxazole (BACTRIM DS, SEPTRA DS) 160-800 mg per tablet Take 1 Tab by mouth two (2) times a day. 20 Tab 0  [DISCONTINUED] montelukast (SINGULAIR) 10 mg tablet Take 1 Tab by mouth daily. 30 Tab 5  [DISCONTINUED] triamcinolone (NASACORT AQ) 55 mcg nasal inhaler 2 Sprays by Both Nostrils route daily. 1 Bottle 0  
 pseudoephedrine HCl (SUDAFED PO) Take  by mouth.  [DISCONTINUED] neomycin-polymyxin-hydrocortisone, buffered, (PEDIOTIC) 3.5-10,000-1 mg/mL-unit/mL-% otic suspension Administer 3 Drops in left ear three (3) times daily. X 4 days 10 mL 1  [DISCONTINUED] cholecalciferol (VITAMIN D3) 1,000 unit tablet Take  by mouth daily.  [DISCONTINUED] MULTIVITAMINS WITH FLUORIDE (MULTI-VITAMIN PO) Take  by mouth. No current facility-administered medications on file prior to visit. Allergies Allergen Reactions  Augmentin [Amoxicillin-Pot Clavulanate] Other (comments) GI upset  Ibuprofen Swelling OB History [facility] 2 Para Term  AB Living SAB  
   
 TAB Ectopic Molar Multiple Live Births Obstetric Comments Menarche 13, LMP 2018, # of children 2, age of 4st delivery [address], Hysterectomy/oophorectomy no/no, Breast bx no, history of breast feeding yes, BCP no, Hormone therapy bi Review of Systems Constitutional: Negative. HENT: Negative. Eyes: Negative. Respiratory: Negative. Cardiovascular: Negative. Gastrointestinal: Negative. Genitourinary: Negative. Musculoskeletal: Negative. Skin: Negative. Neurological: Negative. Endo/Heme/Allergies: Negative. Psychiatric/Behavioral: Negative. Physical Exam 
Exam conducted with a chaperone present. Cardiovascular:  
   Rate and Rhythm: Normal rate and regular rhythm. Heart sounds: Normal heart sounds. Pulmonary:  
   Breath sounds: Normal breath sounds. Chest:  
   Breasts: Breasts are symmetrical.  
      Right: Normal. No swelling, bleeding, inverted nipple, mass, nipple discharge, skin change or tenderness. Left: Normal. No swelling, bleeding, inverted nipple, mass, nipple discharge, skin change or tenderness. Lymphadenopathy:  
   Cervical:  
   Right cervical: No superficial, deep or posterior cervical adenopathy. Left cervical: No superficial, deep or posterior cervical adenopathy. Upper Body:  
   Right upper body: No supraclavicular or axillary adenopathy. Left upper body: No supraclavicular or axillary adenopathy. BREAST ULTRASOUND Indication: BILATERAL breast pain Technique: The area was scanned using a high-frequency linear-array near-field transducer Findings: LEFT - benign appearing lesion at 3:00, 0.86 x 0.77cm. RIGHT - No abnormal mass, lesion, or shadowing noted, no cysts. Impression: LEFT - probable lobule vs small fibroadenoma. RIGHT - normal. 
Disposition: No worrisome finding on ultrasound ASSESSMENT and PLAN 
  ICD-10-CM ICD-9-CM 1. Fibrocystic breast changes, right N60.11 610.1 New patient presents for evaluation of breast pain and enlargement, and is doing well overall. Physical exam today normal. LEFT breast US visualizes benign appearing lesion at 3:00, probable lobule vs small fibroadenoma, 0.86 x 0.77cm. RIGHT breast US normal. Symptoms descriptive of fibrocystic breast pain; will treat with vitamin E and primrose oil. If breast engorgement persists, pt advised to take diuretic; prescription for Spironolactone sent to her pharmacy. Pt to continue self exam and follow up with any changes. F/U PRN. This plan was reviewed with the patient and patient agrees. All questions were answered. Written by Alejandro Gordillo, as dictated by Dr. Aruna Swanson MD. 
 
If you have questions, please do not hesitate to call me. I look forward to following Ms. Alma Delia Gandara along with you.  
 
 
 
Sincerely, 
 
 
Noel Marie MD

## 2020-01-22 NOTE — PATIENT INSTRUCTIONS
Breast Pain: Care Instructions  Your Care Instructions    Breast tenderness and pain may come and go with your monthly periods (cyclic), or it may not follow any pattern (noncyclic). Breast pain is rarely caused by a serious health problem. You may need tests to find the cause. Follow-up care is a key part of your treatment and safety. Be sure to make and go to all appointments, and call your doctor if you are having problems. It's also a good idea to know your test results and keep a list of the medicines you take. How can you care for yourself at home? · If your doctor gave you medicine, take it exactly as prescribed. Call your doctor if you think you are having a problem with your medicine. · Take an over-the-counter pain medicine, such as acetaminophen (Tylenol), ibuprofen (Advil, Motrin), or naproxen (Aleve), to relieve pain and swelling. Read and follow all instructions on the label. · Do not take two or more pain medicines at the same time unless the doctor told you to. Many pain medicines have acetaminophen, which is Tylenol. Too much acetaminophen (Tylenol) can be harmful. · Wear a supportive bra, such as a sports bra or a jog bra. · Cut down on the amount of fat in your diet. If you need help planning healthy meals, see a dietitian. · Get at least 30 minutes of exercise on most days of the week. Walking is a good choice. You also may want to do other activities, such as running, swimming, cycling, or playing tennis or team sports. · Keep a healthy sleep pattern. Go to bed at the same time every night, and get up at the same time every day. When should you call for help? Call your doctor now or seek immediate medical care if:    · You have new changes in a breast, such as:  ? A lump or thickening in your breast or armpit. ? A change in the breast's size or shape. ? Skin changes, such as dimples or puckers. ? Nipple discharge.   ? A change in the color or feel of the skin of your breast or the darker area around the nipple (areola). ? A change in the shape of the nipple (it may look like it's being pulled into the breast).     · You have symptoms of a breast infection, such as:  ? Increased pain, swelling, redness, or warmth around a breast.  ? Red streaks extending from the breast.  ? Pus draining from a breast.  ? A fever.    Watch closely for changes in your health, and be sure to contact your doctor if:    · Your breast pain does not get better after 1 week.     · You have a lump or thickening in your breast or armpit. Where can you learn more? Go to http://mallory-chinmay.info/. Enter F714 in the search box to learn more about \"Breast Pain: Care Instructions. \"  Current as of: June 26, 2019  Content Version: 12.2  © 3862-0528 [employer]. Care instructions adapted under license by [employer] (which disclaims liability or warranty for this information). If you have questions about a medical condition or this instruction, always ask your healthcare professional. [address] any warranty or liability for your use of this information. Breast Pain: Care Instructions  Your Care Instructions    Breast tenderness and pain may come and go with your monthly periods (cyclic), or it may not follow any pattern (noncyclic). Breast pain is rarely caused by a serious health problem. You may need tests to find the cause. Follow-up care is a key part of your treatment and safety. Be sure to make and go to all appointments, and call your doctor if you are having problems. It's also a good idea to know your test results and keep a list of the medicines you take. How can you care for yourself at home? · If your doctor gave you medicine, take it exactly as prescribed. Call your doctor if you think you are having a problem with your medicine.   · Take an over-the-counter pain medicine, such as acetaminophen (Tylenol), ibuprofen (Advil, Motrin), or naproxen (Aleve), to relieve pain and swelling. Read and follow all instructions on the label. · Do not take two or more pain medicines at the same time unless the doctor told you to. Many pain medicines have acetaminophen, which is Tylenol. Too much acetaminophen (Tylenol) can be harmful. · Wear a supportive bra, such as a sports bra or a jog bra. · Cut down on the amount of fat in your diet. If you need help planning healthy meals, see a dietitian. · Get at least 30 minutes of exercise on most days of the week. Walking is a good choice. You also may want to do other activities, such as running, swimming, cycling, or playing tennis or team sports. · Keep a healthy sleep pattern. Go to bed at the same time every night, and get up at the same time every day. When should you call for help? Call your doctor now or seek immediate medical care if:    · You have new changes in a breast, such as:  ? A lump or thickening in your breast or armpit. ? A change in the breast's size or shape. ? Skin changes, such as dimples or puckers. ? Nipple discharge. ? A change in the color or feel of the skin of your breast or the darker area around the nipple (areola). ? A change in the shape of the nipple (it may look like it's being pulled into the breast).     · You have symptoms of a breast infection, such as:  ? Increased pain, swelling, redness, or warmth around a breast.  ? Red streaks extending from the breast.  ? Pus draining from a breast.  ? A fever.    Watch closely for changes in your health, and be sure to contact your doctor if:    · Your breast pain does not get better after 1 week.     · You have a lump or thickening in your breast or armpit. Where can you learn more? Go to http://mallory-chinmay.info/. Enter K066 in the search box to learn more about \"Breast Pain: Care Instructions. \"  Current as of: June 26, 2019  Content Version: 12.2  © 3393-6281 [employer]. Care instructions adapted under license by [employer] (which disclaims liability or warranty for this information). If you have questions about a medical condition or this instruction, always ask your healthcare professional. [address] any warranty or liability for your use of this information.

## 2020-01-22 NOTE — PROGRESS NOTES
HISTORY OF PRESENT ILLNESS  Dayana Tolentino is a 47 y.o. female. HPI  NEW patient consult referred by Dr. Blayne Coto for breast pain and enlargement. Her LEFT breast has a lot of veins and has pain that is always there. She describes \"dull, throbbing or burning ache,\" mostly on the LEFT side, and sometimes on the RIGHT. She states her breasts feel swollen all the time, similar to before a period. However, she notes hx of ablation 2 years ago, and is not having any other cyclical symptoms now. She denies any other breast changes.       2014 -  Revision Breast augmentation  2009 - Breast augmentation     Family History:  Denies FH of breast or ovarian cancer.       Mammogram, 4/2019, BIRADS 1      Past Medical History:   Diagnosis Date    Allergic rhinitis 2/4/2010    Brain tumor ([facility] [address] 2/4/2010       Past Surgical History:   Procedure Laterality Date    BREAST SURGERY PROCEDURE UNLISTED  2007    breast augmentation    HX HEENT  age 13    removal pineal brain tumor    HX SHOULDER ARTHROSCOPY  09/2012    left shoulder    IMPLANT BREAST SILICONE/EQ Bilateral 69/1271    IMPLANT BREAST SILICONE/EQ Bilateral 1034       Social History     Socioeconomic History    Marital status:      Spouse name: Not on file    Number of children: Not on file    Years of education: Not on file    Highest education level: Not on file   Occupational History    Not on file   Social Needs    Financial resource strain: Not on file    Food insecurity:     Worry: Not on file     Inability: Not on file    Transportation needs:     Medical: Not on file     Non-medical: Not on file   Tobacco Use    Smoking status: Never Smoker    Smokeless tobacco: Never Used   Substance and Sexual Activity    Alcohol use:  Yes     Alcohol/week: 3.3 standard drinks     Types: 4 Glasses of wine per week     Comment: socially    Drug use: No    Sexual activity: Never   Lifestyle    Physical activity:     Days per week: Not on file Minutes per session: Not on file    Stress: Not on file   Relationships    Social connections:     Talks on phone: Not on file     Gets together: Not on file     Attends Jain service: Not on file     Active member of club or organization: Not on file     Attends meetings of clubs or organizations: Not on file     Relationship status: Not on file    Intimate partner violence:     Fear of current or ex partner: Not on file     Emotionally abused: Not on file     Physically abused: Not on file     Forced sexual activity: Not on file   Other Topics Concern    Not on file   Social History Narrative    Not on file       Current Outpatient Medications on File Prior to Visit   Medication Sig Dispense Refill    fluticasone (FLONASE) 50 mcg/actuation nasal spray 2 sprays by Both Nostrils route daily for 30 days. 1 Bottle 5    FEXOFENADINE HCL (ALLEGRA PO) Take  by mouth.  [DISCONTINUED] ciprofloxacin HCl (CIPRO) 500 mg tablet Take  by mouth two (2) times a day.  [DISCONTINUED] trimethoprim-sulfamethoxazole (BACTRIM DS, SEPTRA DS) 160-800 mg per tablet Take 1 Tab by mouth two (2) times a day. 20 Tab 0    [DISCONTINUED] montelukast (SINGULAIR) 10 mg tablet Take 1 Tab by mouth daily. 30 Tab 5    [DISCONTINUED] triamcinolone (NASACORT AQ) 55 mcg nasal inhaler 2 Sprays by Both Nostrils route daily. 1 Bottle 0    pseudoephedrine HCl (SUDAFED PO) Take  by mouth.  [DISCONTINUED] neomycin-polymyxin-hydrocortisone, buffered, (PEDIOTIC) 3.5-10,000-1 mg/mL-unit/mL-% otic suspension Administer 3 Drops in left ear three (3) times daily. X 4 days 10 mL 1    [DISCONTINUED] cholecalciferol (VITAMIN D3) 1,000 unit tablet Take  by mouth daily.  [DISCONTINUED] MULTIVITAMINS WITH FLUORIDE (MULTI-VITAMIN PO) Take  by mouth. No current facility-administered medications on file prior to visit.         Allergies   Allergen Reactions    Augmentin [Amoxicillin-Pot Clavulanate] Other (comments)     GI upset  Ibuprofen Swelling       OB History        2    Para        Term                AB        Living           SAB        TAB        Ectopic        Molar        Multiple        Live Births              Obstetric Comments   Menarche 13, LMP 2018, # of children 2, age of 1st delivery 29, Hysterectomy/oophorectomy no/no, Breast bx no, history of breast feeding yes, BCP no, Hormone therapy bi             Review of Systems   Constitutional: Negative. HENT: Negative. Eyes: Negative. Respiratory: Negative. Cardiovascular: Negative. Gastrointestinal: Negative. Genitourinary: Negative. Musculoskeletal: Negative. Skin: Negative. Neurological: Negative. Endo/Heme/Allergies: Negative. Psychiatric/Behavioral: Negative. Physical Exam  Exam conducted with a chaperone present. Cardiovascular:      Rate and Rhythm: Normal rate and regular rhythm. Heart sounds: Normal heart sounds. Pulmonary:      Breath sounds: Normal breath sounds. Chest:      Breasts: Breasts are symmetrical.         Right: Normal. No swelling, bleeding, inverted nipple, mass, nipple discharge, skin change or tenderness. Left: Normal. No swelling, bleeding, inverted nipple, mass, nipple discharge, skin change or tenderness. Lymphadenopathy:      Cervical:      Right cervical: No superficial, deep or posterior cervical adenopathy. Left cervical: No superficial, deep or posterior cervical adenopathy. Upper Body:      Right upper body: No supraclavicular or axillary adenopathy. Left upper body: No supraclavicular or axillary adenopathy. BREAST ULTRASOUND  Indication: BILATERAL breast pain  Technique: The area was scanned using a high-frequency linear-array near-field transducer  Findings: LEFT - benign appearing lesion at 3:00, 0.86 x 0.77cm. RIGHT - No abnormal mass, lesion, or shadowing noted, no cysts. Impression: LEFT - probable lobule vs small fibroadenoma. RIGHT - normal.  Disposition: No worrisome finding on ultrasound      ASSESSMENT and PLAN    ICD-10-CM ICD-9-CM    1. Fibrocystic breast changes, right N60.11 610.1       New patient presents for evaluation of breast pain and enlargement, and is doing well overall. Physical exam today normal. LEFT breast US visualizes benign appearing lesion at 3:00, probable lobule vs small fibroadenoma, 0.86 x 0.77cm. RIGHT breast US normal. Symptoms descriptive of fibrocystic breast pain; will treat with vitamin E and primrose oil. If breast engorgement persists, pt advised to take diuretic; prescription for Spironolactone sent to her pharmacy. Pt to continue self exam and follow up with any changes. F/U PRN. This plan was reviewed with the patient and patient agrees. All questions were answered.     Written by Arsalan Gallegos, as dictated by Dr. Secundino Saint, MD.

## 2020-01-22 NOTE — PROGRESS NOTES
HISTORY OF PRESENT ILLNESS Rodrigo Felton is a 47 y.o. female. HPI  NEW patient consult referred by Dr. Kaylin Grider for breast pain and enlargement. Her LEFT breast has a lot of veins and has pain that is always there. Denies any other breast changes. 2014 -  Revision Breast augmentation 2009 - Breast augmentation Family History: 
Denies FH of breast or ovarian cancer. Mammogram, 4/2019, BIRADS 1 Review of Systems All other systems reviewed and are negative. Physical Exam 
 
ASSESSMENT and PLAN 
{ASSESSMENT/PLAN:[id number]}

## 2020-01-22 NOTE — LETTER
1/27/2020 10:27 AM 
 
Patient:  Birtha Schilder YOB: 1965 Date of Visit: 1/22/2020 Dear Dr. Barroso Risk: 
 
 
Thank you for referring Ms. Altagracia Montgomery to me for evaluation/treatment. Below are the relevant portions of my assessment and plan of care. HISTORY OF PRESENT ILLNESS Birtha Schilder is a 47 y.o. female. HPI 
NEW patient consult referred by Dr. Alfonso Higuera for breast pain and enlargement. Her LEFT breast has a lot of veins and has pain that is always there. She describes \"dull, throbbing or burning ache,\" mostly on the LEFT side, and sometimes on the RIGHT. She states her breasts feel swollen all the time, similar to before a period. However, she notes hx of ablation 2 years ago, and is not having any other cyclical symptoms now. She denies any other breast changes.   
  
2014 -  Revision Breast augmentation 2009 - Breast augmentation 
  
Family History: 
Denies FH of breast or ovarian cancer.  
   
Mammogram, 4/2019, BIRADS 1 Past Medical History:  
Diagnosis Date  Allergic rhinitis 2/4/2010  Brain tumor ([facility] [address] 2/4/2010 Past Surgical History:  
Procedure Laterality Date  BREAST SURGERY PROCEDURE UNLISTED  2007  
 breast augmentation  HX HEENT  age 13  
 removal pineal brain tumor  HX SHOULDER ARTHROSCOPY  09/2012  
 left shoulder  IMPLANT BREAST SILICONE/EQ Bilateral 22/6267  IMPLANT BREAST SILICONE/EQ Bilateral 9891 Social History Socioeconomic History  Marital status:  Spouse name: Not on file  Number of children: Not on file  Years of education: Not on file  Highest education level: Not on file Occupational History  Not on file Social Needs  Financial resource strain: Not on file  Food insecurity:  
  Worry: Not on file Inability: Not on file  Transportation needs:  
  Medical: Not on file Non-medical: Not on file Tobacco Use  Smoking status: Never Smoker  Smokeless tobacco: Never Used Substance and Sexual Activity  Alcohol use: Yes Alcohol/week: 3.3 standard drinks Types: 4 Glasses of wine per week Comment: socially  Drug use: No  
 Sexual activity: Never Lifestyle  Physical activity:  
  Days per week: Not on file Minutes per session: Not on file  Stress: Not on file Relationships  Social connections:  
  Talks on phone: Not on file Gets together: Not on file Attends Hindu service: Not on file Active member of club or organization: Not on file Attends meetings of clubs or organizations: Not on file Relationship status: Not on file  Intimate partner violence:  
  Fear of current or ex partner: Not on file Emotionally abused: Not on file Physically abused: Not on file Forced sexual activity: Not on file Other Topics Concern  Not on file Social History Narrative  Not on file Current Outpatient Medications on File Prior to Visit Medication Sig Dispense Refill  fluticasone (FLONASE) 50 mcg/actuation nasal spray 2 sprays by Both Nostrils route daily for 30 days. 1 Bottle 5  FEXOFENADINE HCL (ALLEGRA PO) Take  by mouth.  [DISCONTINUED] ciprofloxacin HCl (CIPRO) 500 mg tablet Take  by mouth two (2) times a day.  [DISCONTINUED] trimethoprim-sulfamethoxazole (BACTRIM DS, SEPTRA DS) 160-800 mg per tablet Take 1 Tab by mouth two (2) times a day. 20 Tab 0  [DISCONTINUED] montelukast (SINGULAIR) 10 mg tablet Take 1 Tab by mouth daily. 30 Tab 5  [DISCONTINUED] triamcinolone (NASACORT AQ) 55 mcg nasal inhaler 2 Sprays by Both Nostrils route daily. 1 Bottle 0  
 pseudoephedrine HCl (SUDAFED PO) Take  by mouth.  [DISCONTINUED] neomycin-polymyxin-hydrocortisone, buffered, (PEDIOTIC) 3.5-10,000-1 mg/mL-unit/mL-% otic suspension Administer 3 Drops in left ear three (3) times daily. X 4 days 10 mL 1  [DISCONTINUED] cholecalciferol (VITAMIN D3) 1,000 unit tablet Take  by mouth daily.  [DISCONTINUED] MULTIVITAMINS WITH FLUORIDE (MULTI-VITAMIN PO) Take  by mouth. No current facility-administered medications on file prior to visit. Allergies Allergen Reactions  Augmentin [Amoxicillin-Pot Clavulanate] Other (comments) GI upset  Ibuprofen Swelling OB History [facility] 2 Para Term  AB Living SAB  
   
 TAB Ectopic Molar Multiple Live Births Obstetric Comments Menarche 13, LMP 2018, # of children 2, age of 4st delivery 29, Hysterectomy/oophorectomy no/no, Breast bx no, history of breast feeding yes, BCP no, Hormone therapy bi Review of Systems Constitutional: Negative. HENT: Negative. Eyes: Negative. Respiratory: Negative. Cardiovascular: Negative. Gastrointestinal: Negative. Genitourinary: Negative. Musculoskeletal: Negative. Skin: Negative. Neurological: Negative. Endo/Heme/Allergies: Negative. Psychiatric/Behavioral: Negative. Physical Exam 
Exam conducted with a chaperone present. Cardiovascular:  
   Rate and Rhythm: Normal rate and regular rhythm. Heart sounds: Normal heart sounds. Pulmonary:  
   Breath sounds: Normal breath sounds. Chest:  
   Breasts: Breasts are symmetrical.  
      Right: Normal. No swelling, bleeding, inverted nipple, mass, nipple discharge, skin change or tenderness. Left: Normal. No swelling, bleeding, inverted nipple, mass, nipple discharge, skin change or tenderness. Lymphadenopathy:  
   Cervical:  
   Right cervical: No superficial, deep or posterior cervical adenopathy. Left cervical: No superficial, deep or posterior cervical adenopathy. Upper Body:  
   Right upper body: No supraclavicular or axillary adenopathy. Left upper body: No supraclavicular or axillary adenopathy. BREAST ULTRASOUND Indication: BILATERAL breast pain Technique: The area was scanned using a high-frequency linear-array near-field transducer Findings: LEFT - benign appearing lesion at 3:00, 0.86 x 0.77cm. RIGHT - No abnormal mass, lesion, or shadowing noted, no cysts. Impression: LEFT - probable lobule vs small fibroadenoma. RIGHT - normal. 
Disposition: No worrisome finding on ultrasound ASSESSMENT and PLAN 
  ICD-10-CM ICD-9-CM 1. Fibrocystic breast changes, right N60.11 610.1 New patient presents for evaluation of breast pain and enlargement, and is doing well overall. Physical exam today normal. LEFT breast US visualizes benign appearing lesion at 3:00, probable lobule vs small fibroadenoma, 0.86 x 0.77cm. RIGHT breast US normal. Symptoms descriptive of fibrocystic breast pain; will treat with vitamin E and primrose oil. If breast engorgement persists, pt advised to take diuretic; prescription for Spironolactone sent to her pharmacy. Pt to continue self exam and follow up with any changes. F/U PRN. This plan was reviewed with the patient and patient agrees. All questions were answered. Written by Daniela Bailon, as dictated by Dr. Deandre Greene MD. 
 
If you have questions, please do not hesitate to call me. I look forward to following Ms. Sims along with you.  
 
 
 
Sincerely, 
 
 
Tiki Pulido MD

## 2020-01-27 NOTE — COMMUNICATION BODY
HISTORY OF PRESENT ILLNESS  Rodrigo Felton is a 47 y.o. female. HPI  NEW patient consult referred by Dr. Kaylin Grider for breast pain and enlargement. Her LEFT breast has a lot of veins and has pain that is always there. She describes \"dull, throbbing or burning ache,\" mostly on the LEFT side, and sometimes on the RIGHT. She states her breasts feel swollen all the time, similar to before a period. However, she notes hx of ablation 2 years ago, and is not having any other cyclical symptoms now. She denies any other breast changes.       2014 -  Revision Breast augmentation  2009 - Breast augmentation     Family History:  Denies FH of breast or ovarian cancer.       Mammogram, 4/2019, BIRADS 1      Past Medical History:   Diagnosis Date    Allergic rhinitis 2/4/2010    Brain tumor ([facility] [address] 2/4/2010       Past Surgical History:   Procedure Laterality Date    BREAST SURGERY PROCEDURE UNLISTED  2007    breast augmentation    HX HEENT  age 13    removal pineal brain tumor    HX SHOULDER ARTHROSCOPY  09/2012    left shoulder    IMPLANT BREAST SILICONE/EQ Bilateral 08/0132    IMPLANT BREAST SILICONE/EQ Bilateral 7056       Social History     Socioeconomic History    Marital status:      Spouse name: Not on file    Number of children: Not on file    Years of education: Not on file    Highest education level: Not on file   Occupational History    Not on file   Social Needs    Financial resource strain: Not on file    Food insecurity:     Worry: Not on file     Inability: Not on file    Transportation needs:     Medical: Not on file     Non-medical: Not on file   Tobacco Use    Smoking status: Never Smoker    Smokeless tobacco: Never Used   Substance and Sexual Activity    Alcohol use:  Yes     Alcohol/week: 3.3 standard drinks     Types: 4 Glasses of wine per week     Comment: socially    Drug use: No    Sexual activity: Never   Lifestyle    Physical activity:     Days per week: Not on file Minutes per session: Not on file    Stress: Not on file   Relationships    Social connections:     Talks on phone: Not on file     Gets together: Not on file     Attends Adventism service: Not on file     Active member of club or organization: Not on file     Attends meetings of clubs or organizations: Not on file     Relationship status: Not on file    Intimate partner violence:     Fear of current or ex partner: Not on file     Emotionally abused: Not on file     Physically abused: Not on file     Forced sexual activity: Not on file   Other Topics Concern    Not on file   Social History Narrative    Not on file       Current Outpatient Medications on File Prior to Visit   Medication Sig Dispense Refill    fluticasone (FLONASE) 50 mcg/actuation nasal spray 2 sprays by Both Nostrils route daily for 30 days. 1 Bottle 5    FEXOFENADINE HCL (ALLEGRA PO) Take  by mouth.  [DISCONTINUED] ciprofloxacin HCl (CIPRO) 500 mg tablet Take  by mouth two (2) times a day.  [DISCONTINUED] trimethoprim-sulfamethoxazole (BACTRIM DS, SEPTRA DS) 160-800 mg per tablet Take 1 Tab by mouth two (2) times a day. 20 Tab 0    [DISCONTINUED] montelukast (SINGULAIR) 10 mg tablet Take 1 Tab by mouth daily. 30 Tab 5    [DISCONTINUED] triamcinolone (NASACORT AQ) 55 mcg nasal inhaler 2 Sprays by Both Nostrils route daily. 1 Bottle 0    pseudoephedrine HCl (SUDAFED PO) Take  by mouth.  [DISCONTINUED] neomycin-polymyxin-hydrocortisone, buffered, (PEDIOTIC) 3.5-10,000-1 mg/mL-unit/mL-% otic suspension Administer 3 Drops in left ear three (3) times daily. X 4 days 10 mL 1    [DISCONTINUED] cholecalciferol (VITAMIN D3) 1,000 unit tablet Take  by mouth daily.  [DISCONTINUED] MULTIVITAMINS WITH FLUORIDE (MULTI-VITAMIN PO) Take  by mouth. No current facility-administered medications on file prior to visit.         Allergies   Allergen Reactions    Augmentin [Amoxicillin-Pot Clavulanate] Other (comments)     GI upset  Ibuprofen Swelling       OB History        2    Para        Term                AB        Living           SAB        TAB        Ectopic        Molar        Multiple        Live Births              Obstetric Comments   Menarche 13, LMP 2018, # of children 2, age of 1st delivery 29, Hysterectomy/oophorectomy no/no, Breast bx no, history of breast feeding yes, BCP no, Hormone therapy bi             Review of Systems   Constitutional: Negative. HENT: Negative. Eyes: Negative. Respiratory: Negative. Cardiovascular: Negative. Gastrointestinal: Negative. Genitourinary: Negative. Musculoskeletal: Negative. Skin: Negative. Neurological: Negative. Endo/Heme/Allergies: Negative. Psychiatric/Behavioral: Negative. Physical Exam  Exam conducted with a chaperone present. Cardiovascular:      Rate and Rhythm: Normal rate and regular rhythm. Heart sounds: Normal heart sounds. Pulmonary:      Breath sounds: Normal breath sounds. Chest:      Breasts: Breasts are symmetrical.         Right: Normal. No swelling, bleeding, inverted nipple, mass, nipple discharge, skin change or tenderness. Left: Normal. No swelling, bleeding, inverted nipple, mass, nipple discharge, skin change or tenderness. Lymphadenopathy:      Cervical:      Right cervical: No superficial, deep or posterior cervical adenopathy. Left cervical: No superficial, deep or posterior cervical adenopathy. Upper Body:      Right upper body: No supraclavicular or axillary adenopathy. Left upper body: No supraclavicular or axillary adenopathy. BREAST ULTRASOUND  Indication: BILATERAL breast pain  Technique: The area was scanned using a high-frequency linear-array near-field transducer  Findings: LEFT - benign appearing lesion at 3:00, 0.86 x 0.77cm. RIGHT - No abnormal mass, lesion, or shadowing noted, no cysts. Impression: LEFT - probable lobule vs small fibroadenoma. RIGHT - normal.  Disposition: No worrisome finding on ultrasound      ASSESSMENT and PLAN    ICD-10-CM ICD-9-CM    1. Fibrocystic breast changes, right N60.11 610.1       New patient presents for evaluation of breast pain and enlargement, and is doing well overall. Physical exam today normal. LEFT breast US visualizes benign appearing lesion at 3:00, probable lobule vs small fibroadenoma, 0.86 x 0.77cm. RIGHT breast US normal. Symptoms descriptive of fibrocystic breast pain; will treat with vitamin E and primrose oil. If breast engorgement persists, pt advised to take diuretic; prescription for Spironolactone sent to her pharmacy. Pt to continue self exam and follow up with any changes. F/U PRN. This plan was reviewed with the patient and patient agrees. All questions were answered.     Written by Daniel Segura, as dictated by Dr. Zoie Carmichael MD.